# Patient Record
Sex: FEMALE | Race: WHITE
[De-identification: names, ages, dates, MRNs, and addresses within clinical notes are randomized per-mention and may not be internally consistent; named-entity substitution may affect disease eponyms.]

---
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
## 2022-04-07 ENCOUNTER — APPOINTMENT (OUTPATIENT)
Dept: NEUROLOGY | Facility: CLINIC | Age: 22
End: 2022-04-07
Payer: MEDICAID

## 2022-04-07 VITALS
TEMPERATURE: 97.8 F | BODY MASS INDEX: 23.32 KG/M2 | HEART RATE: 92 BPM | SYSTOLIC BLOOD PRESSURE: 115 MMHG | OXYGEN SATURATION: 99 % | HEIGHT: 65 IN | DIASTOLIC BLOOD PRESSURE: 76 MMHG | WEIGHT: 140 LBS

## 2022-04-07 DIAGNOSIS — G43.009 MIGRAINE W/OUT AURA, NOT INTRACTABLE, W/OUT STATUS MIGRAINOSUS: ICD-10-CM

## 2022-04-07 DIAGNOSIS — Z72.89 OTHER PROBLEMS RELATED TO LIFESTYLE: ICD-10-CM

## 2022-04-07 DIAGNOSIS — Z82.0 FAMILY HISTORY OF EPILEPSY AND OTHER DISEASES OF THE NERVOUS SYSTEM: ICD-10-CM

## 2022-04-07 DIAGNOSIS — F17.200 NICOTINE DEPENDENCE, UNSPECIFIED, UNCOMPLICATED: ICD-10-CM

## 2022-04-07 PROBLEM — Z00.00 ENCOUNTER FOR PREVENTIVE HEALTH EXAMINATION: Status: ACTIVE | Noted: 2022-04-07

## 2022-04-07 PROCEDURE — 99204 OFFICE O/P NEW MOD 45 MIN: CPT

## 2022-04-07 RX ORDER — NORGESTIMATE AND ETHINYL ESTRADIOL 7DAYSX3 LO
KIT ORAL
Refills: 0 | Status: ACTIVE | COMMUNITY

## 2022-04-07 NOTE — HISTORY OF PRESENT ILLNESS
[FreeTextEntry1] : Pt is 20 yo LH female states for past 5 months very bad migraines.  Throws up a lot and 3 times had blackouts in both her eyes and for year now hands shakes.  First migraine about age 20.   In past just regular headaches. \par \par When has blackouts she can't see for about a minutes.  She lays down.  Can't sleep due to the pain.  \par Tyenol doesn't help.\par \par No triggers for migraines. Mother gets migraines.\par No double vision.\par Sees black spots when stands.  Has been occurring for years.\par No tinnitus currently.  Had it in past, eventually went away.  5 years ago.\par Gets transient dizziness on elevators.  No hx of motion sickness or car sickness.\par \par Feels "really tired" about same time as migraines started.  Sleeps a lot more.  14 hours.  Sleeps patterns not regular.  Takes more naps than uses.   She gets dizzy when her body heat goes up (past year in gym gets dizzy and headache - not as severe as current ones). Feels like brain is too big for her head.\par \par

## 2022-04-07 NOTE — REVIEW OF SYSTEMS
[Feeling Tired] : feeling tired [Dizziness] : dizziness [Lightheadedness] : lightheadedness [Migraine Headache] : migraine headaches [Eyesight Problems] : eyesight problems [Vomiting] : vomiting [Fever] : no fever [Chills] : no chills [Feeling Poorly] : not feeling poorly [Recent Weight Gain (___ Lbs)] : no recent weight gain [Confused or Disoriented] : no confusion [Memory Lapses or Loss] : no memory loss [Decr. Concentrating Ability] : no decrease in concentrating ability [Facial Weakness] : no facial weakness [Arm Weakness] : no arm weakness [Hand Weakness] : no hand weakness [Leg Weakness] : no leg weakness [Poor Coordination] : good coordination [Numbness] : no numbness [Tingling] : no tingling [Seizures] : no convulsions [Fainting] : no fainting [Vertigo] : no vertigo [Cluster Headache] : no cluster headache [Tension Headache] : no tension-type headache [Difficulty Walking] : no difficulty walking [Inability to Walk] : able to walk [Suicidal] : not suicidal [Sleep Disturbances] : no sleep disturbances [Anxiety] : no anxiety [Depression] : no depression [Eye Pain] : no eye pain [Earache] : no earache [Loss Of Hearing] : no hearing loss [Heart Rate Is Slow] : the heart rate was not slow [Heart Rate Is Fast] : the heart rate was not fast [Chest Pain] : no chest pain [Palpitations] : no palpitations [Shortness Of Breath] : no shortness of breath [Wheezing] : no wheezing [Cough] : no cough [Abdominal Pain] : no abdominal pain [Constipation] : no constipation [Diarrhea] : no diarrhea [Dysuria] : no dysuria [Incontinence] : no incontinence [Joint Pain] : no joint pain [Joint Swelling] : no joint swelling [Joint Stiffness] : no joint stiffness [Skin Lesions] : no skin lesions [Skin Wound] : no skin wound [Hot Flashes] : no hot flashes [Muscle Weakness] : no muscle weakness [Easy Bleeding] : no tendency for easy bleeding [Easy Bruising] : no tendency for easy bruising [FreeTextEntry3] : left eye used to be 20/20, then 20/25, then 20/30

## 2022-04-07 NOTE — ASSESSMENT
[FreeTextEntry1] : Impression:\par Migraine headaches w/o aura.  She has family hx of migraines, drinks a lot of caffeine and has irregular sleep habits.  There were a few punctate WM changes seen in subcortical regions that seem compatible with her migraine history. \par \par Plan:\par 1.  Reassured patient regarding her normal exam and normal appearing brain on MRI.  I think the WM changes seen are minimal, not likely to be due to demyelinating disease (MS) and more likely related to migraine.\par 2.  Patient may get repeat MRI brain w/o contrast in 6-12 months to assess for any change.\par 3.  Encouraged patient to reduce caffeine, improve sleep patterns and get vitamin D level checked and if low supplement.\par 4.  May want to have PCP check TFT's given some tremulousness she feels in hands.\par 5.  She can consider magnesium oxide 400 mg/day and/or riboflavin (Vitamin B2) 400 mg/day for migraine prevention.  She may use advil or excedrin prn for acute migraine relief if needed.  If migraines become more severe or fail to respond to above may call for prescripton of sumatriptan or zolmitriptan.\par 6.  Return in 6 months.

## 2022-04-17 ENCOUNTER — TRANSCRIPTION ENCOUNTER (OUTPATIENT)
Age: 22
End: 2022-04-17

## 2022-07-14 ENCOUNTER — EMERGENCY (EMERGENCY)
Facility: HOSPITAL | Age: 22
LOS: 0 days | Discharge: HOME | End: 2022-07-14
Attending: PEDIATRICS | Admitting: PEDIATRICS

## 2022-07-14 VITALS
RESPIRATION RATE: 18 BRPM | WEIGHT: 145.28 LBS | OXYGEN SATURATION: 100 % | SYSTOLIC BLOOD PRESSURE: 124 MMHG | HEART RATE: 86 BPM | TEMPERATURE: 99 F | DIASTOLIC BLOOD PRESSURE: 74 MMHG

## 2022-07-14 DIAGNOSIS — J02.9 ACUTE PHARYNGITIS, UNSPECIFIED: ICD-10-CM

## 2022-07-14 DIAGNOSIS — R05.8 OTHER SPECIFIED COUGH: ICD-10-CM

## 2022-07-14 DIAGNOSIS — F17.200 NICOTINE DEPENDENCE, UNSPECIFIED, UNCOMPLICATED: ICD-10-CM

## 2022-07-14 DIAGNOSIS — R59.9 ENLARGED LYMPH NODES, UNSPECIFIED: ICD-10-CM

## 2022-07-14 PROCEDURE — 99283 EMERGENCY DEPT VISIT LOW MDM: CPT

## 2022-07-14 RX ORDER — DEXAMETHASONE 0.5 MG/5ML
10 ELIXIR ORAL ONCE
Refills: 0 | Status: COMPLETED | OUTPATIENT
Start: 2022-07-14 | End: 2022-07-14

## 2022-07-14 RX ADMIN — Medication 10 MILLIGRAM(S): at 10:05

## 2022-07-14 NOTE — ED PROVIDER NOTE - PATIENT PORTAL LINK FT
You can access the FollowMyHealth Patient Portal offered by [facility] by registering at the following website: [email or web address]. By joining [employer]’s FollowMyHealth portal, you will also be able to view your health information using other applications (apps) compatible with our system.

## 2022-07-14 NOTE — ED PROVIDER NOTE - NS ED ROS FT
Please advise if ok to change and what test would you like ordered. GEN:  no fever, no chills, no generalized weakness  NEURO:  no headache, no dizziness  ENT: +sore throat, no runny nose  CV:  no chest pain, no palpitations  RESP:  no sob, +cough  GI:  no nausea, no vomiting  SKIN:  no rash, no bruising

## 2022-07-14 NOTE — ED PROVIDER NOTE - NSFOLLOWUPINSTRUCTIONS_ED_ALL_ED_FT
Sore Throat      A sore throat is pain, burning, irritation, or scratchiness in the throat. When you have a sore throat, you may feel pain or tenderness in your throat when you swallow or talk.    Many things can cause a sore throat, including:  •An infection.      •Seasonal allergies.      •Dryness in the air.      •Irritants, such as smoke or pollution.      •Radiation treatment for cancer.      •Gastroesophageal reflux disease (GERD).      •A tumor.      A sore throat is often the first sign of another sickness. It may happen with other symptoms, such as coughing, sneezing, fever, and swollen neck glands. Most sore throats go away without medical treatment.      Follow these instructions at home:      Juice, water, and tea.        A do not smoke cigarettes sign.      Medicines     •Take over-the-counter and prescription medicines only as told by your health care provider.      •Children often get sore throats. Do not give your child aspirin because of the association with Reye's syndrome.      •Use throat sprays to soothe your throat as told by your health care provider.      Managing pain     To help with pain, try:  •Sipping warm liquids, such as broth, herbal tea, or warm water.      •Eating or drinking cold or frozen liquids, such as frozen ice pops.      •Gargling with a mixture of salt and water 3–4 times a day or as needed. To make salt water, completely dissolve ½–1 tsp (3–6 g) of salt in 1 cup (237 mL) of warm water.      •Sucking on hard candy or throat lozenges.      •Putting a cool-mist humidifier in your bedroom at night to moisten the air.      •Sitting in the bathroom with the door closed for 5–10 minutes while you run hot water in the shower.      General instructions    •Do not use any products that contain nicotine or tobacco. These products include cigarettes, chewing tobacco, and vaping devices, such as e-cigarettes. If you need help quitting, ask your health care provider.      •Rest as needed.      •Drink enough fluid to keep your urine pale yellow.      •Wash your hands often with soap and water for at least 20 seconds. If soap and water are not available, use hand .        Contact a health care provider if:    •You have a fever for more than 2–3 days.      •You have symptoms that last for more than 2–3 days.      •Your throat does not get better within 7 days.      •You have a fever and your symptoms suddenly get worse.        Get help right away if:    •You have difficulty breathing.      •You cannot swallow fluids, soft foods, or your saliva.      •You have increased swelling in your throat or neck.      •You have persistent nausea and vomiting.      These symptoms may represent a serious problem that is an emergency. Do not wait to see if the symptoms will go away. Get medical help right away. Call your local emergency services (911 in the [place] Do not drive yourself to the hospital.       Summary    •A sore throat is pain, burning, irritation, or scratchiness in the throat. Many things can cause a sore throat.      •Take over-the-counter medicines only as told by your health care provider.      •Rest as needed.      •Drink enough fluid to keep your urine pale yellow.      •Contact a health care provider if your throat does not get better within 7 days.      This information is not intended to replace advice given to you by your health care provider. Make sure you discuss any questions you have with your health care provider.

## 2022-07-14 NOTE — ED PROVIDER NOTE - PHYSICAL EXAMINATION
CONSTITUTIONAL: Well-developed; well-nourished; in no acute distress.   SKIN: warm, dry  HEAD: Normocephalic; atraumatic.  EYES: no conjunctival injection.  EOMI.   ENT: No nasal discharge; airway clear. mildly erythema of pharynx  NECK: Supple; non tender. +left submandibular non-tender well-circumscribed lymph node  EXT: Normal ROM.  No clubbing, cyanosis or edema.   NEURO: Alert, oriented, grossly unremarkable.  PSYCH: Cooperative, appropriate.

## 2022-07-14 NOTE — ED PROVIDER NOTE - OBJECTIVE STATEMENT
23 yo female, no PMHx, presents with sore throat x1 week, no alleviating or aggravating factors, associated with intermittent dry cough. Of note, patient states she had a swollen lymph node a few weeks ago for which she had an US that showed that it appeared benign non-reactive and she is pending ENT follow up but has not been able to yet. Denies fevers, chills, nausea, vomiting, headache, dizziness, shortness of breath.

## 2022-07-14 NOTE — ED PROVIDER NOTE - CARE PROVIDERS DIRECT ADDRESSES
,[email or web address]

## 2022-07-14 NOTE — ED PROVIDER NOTE - CARE PROVIDER_API CALL
Preeti Keller (MD)  Pediatrics  [address] [place] [address]  Phone: [phone]  Fax: [phone]  Follow Up Time: 1-3 Days    Denise Oliveira)  Surgery  ENT  [address], [address] [place] [address]  Phone: [phone]  Fax: [phone]  Follow Up Time: 1-3 Days

## 2022-07-14 NOTE — ED PROVIDER NOTE - PROVIDER TOKENS
PROVIDER:[TOKEN:[[id number]],FOLLOWUP:[1-3 Days]],PROVIDER:[TOKEN:[[id number]],FOLLOWUP:[1-3 Days]]

## 2022-07-14 NOTE — ED PROVIDER NOTE - ATTENDING CONTRIBUTION TO CARE
21 yo f presents for evaluation of a sore throat. Denies any difficulty swallowing or drooling. Pt noticed a lymph node and showed the doctor she works for in [place] who sent her for a US. Pt saw an ENT who thought it was reactive LN prescribed abx which she took pt reports its still there and now her throat hurts so came for evaluation. Has not seen ent for follow up yet. Pt think the LN got smaller since starting abx. No uri symptoms. No recent illnesses. No other concerns. Pt states she cant take off of work so "I had to come to the ed so my boss think its serious". VS reviewed pt well appearing nad heent eomi perrl no conjunctival injection TM wnl no sign of mastoditis pharynx no erythema or exudates no significant cervical LAD pt LN in question is barely palpable nontedner mobile cvs rrr s1 s2 no murmurs lungs ctabl abd soft nt nd no guarding no HSM ext from x 4 skin no rash wwp cap refil <2 neuro exam grossly normal A: Lymphadenopathy P: Outpt ent follow up as previously discussed with ENT who she already saw.

## 2022-07-25 ENCOUNTER — NON-APPOINTMENT (OUTPATIENT)
Age: 22
End: 2022-07-25

## 2022-07-27 ENCOUNTER — NON-APPOINTMENT (OUTPATIENT)
Age: 22
End: 2022-07-27

## 2022-08-31 ENCOUNTER — APPOINTMENT (OUTPATIENT)
Dept: OTOLARYNGOLOGY | Facility: CLINIC | Age: 22
End: 2022-08-31

## 2022-09-10 ENCOUNTER — NON-APPOINTMENT (OUTPATIENT)
Age: 22
End: 2022-09-10

## 2022-10-10 ENCOUNTER — NON-APPOINTMENT (OUTPATIENT)
Age: 22
End: 2022-10-10

## 2022-11-05 ENCOUNTER — EMERGENCY (EMERGENCY)
Facility: HOSPITAL | Age: 22
LOS: 0 days | Discharge: HOME | End: 2022-11-05
Attending: EMERGENCY MEDICINE | Admitting: EMERGENCY MEDICINE

## 2022-11-05 VITALS
SYSTOLIC BLOOD PRESSURE: 142 MMHG | HEART RATE: 113 BPM | WEIGHT: 145.06 LBS | RESPIRATION RATE: 18 BRPM | DIASTOLIC BLOOD PRESSURE: 75 MMHG | OXYGEN SATURATION: 100 % | TEMPERATURE: 98 F

## 2022-11-05 VITALS — HEART RATE: 89 BPM

## 2022-11-05 DIAGNOSIS — R61 GENERALIZED HYPERHIDROSIS: ICD-10-CM

## 2022-11-05 DIAGNOSIS — R06.02 SHORTNESS OF BREATH: ICD-10-CM

## 2022-11-05 DIAGNOSIS — R07.9 CHEST PAIN, UNSPECIFIED: ICD-10-CM

## 2022-11-05 DIAGNOSIS — M54.9 DORSALGIA, UNSPECIFIED: ICD-10-CM

## 2022-11-05 DIAGNOSIS — Z79.3 LONG TERM (CURRENT) USE OF HORMONAL CONTRACEPTIVES: ICD-10-CM

## 2022-11-05 LAB
ALBUMIN SERPL ELPH-MCNC: 4.5 G/DL — SIGNIFICANT CHANGE UP (ref 3.5–5.2)
ALP SERPL-CCNC: 94 U/L — SIGNIFICANT CHANGE UP (ref 30–115)
ALT FLD-CCNC: 10 U/L — LOW (ref 14–37)
ANION GAP SERPL CALC-SCNC: 11 MMOL/L — SIGNIFICANT CHANGE UP (ref 7–14)
AST SERPL-CCNC: 13 U/L — LOW (ref 14–37)
BASOPHILS # BLD AUTO: 0.05 K/UL — SIGNIFICANT CHANGE UP (ref 0–0.2)
BASOPHILS NFR BLD AUTO: 0.5 % — SIGNIFICANT CHANGE UP (ref 0–1)
BILIRUB SERPL-MCNC: <0.2 MG/DL — SIGNIFICANT CHANGE UP (ref 0.2–1.2)
BUN SERPL-MCNC: 11 MG/DL — SIGNIFICANT CHANGE UP (ref 10–20)
CALCIUM SERPL-MCNC: 9.5 MG/DL — SIGNIFICANT CHANGE UP (ref 8.4–10.5)
CHLORIDE SERPL-SCNC: 102 MMOL/L — SIGNIFICANT CHANGE UP (ref 98–110)
CO2 SERPL-SCNC: 25 MMOL/L — SIGNIFICANT CHANGE UP (ref 17–32)
CREAT SERPL-MCNC: 0.6 MG/DL — LOW (ref 0.7–1.5)
D DIMER BLD IA.RAPID-MCNC: <150 NG/ML DDU — SIGNIFICANT CHANGE UP (ref 0–230)
EGFR: 132 ML/MIN/1.73M2 — SIGNIFICANT CHANGE UP
EOSINOPHIL # BLD AUTO: 0.18 K/UL — SIGNIFICANT CHANGE UP (ref 0–0.7)
EOSINOPHIL NFR BLD AUTO: 1.9 % — SIGNIFICANT CHANGE UP (ref 0–8)
GLUCOSE SERPL-MCNC: 94 MG/DL — SIGNIFICANT CHANGE UP (ref 70–99)
HCG SERPL QL: NEGATIVE — SIGNIFICANT CHANGE UP
HCT VFR BLD CALC: 39.4 % — SIGNIFICANT CHANGE UP (ref 37–47)
HGB BLD-MCNC: 13.3 G/DL — SIGNIFICANT CHANGE UP (ref 12–16)
IMM GRANULOCYTES NFR BLD AUTO: 0.3 % — SIGNIFICANT CHANGE UP (ref 0.1–0.3)
LYMPHOCYTES # BLD AUTO: 3.08 K/UL — SIGNIFICANT CHANGE UP (ref 1.2–3.4)
LYMPHOCYTES # BLD AUTO: 32.6 % — SIGNIFICANT CHANGE UP (ref 20.5–51.1)
MAGNESIUM SERPL-MCNC: 2 MG/DL — SIGNIFICANT CHANGE UP (ref 1.8–2.4)
MCHC RBC-ENTMCNC: 30.4 PG — SIGNIFICANT CHANGE UP (ref 27–31)
MCHC RBC-ENTMCNC: 33.8 G/DL — SIGNIFICANT CHANGE UP (ref 32–37)
MCV RBC AUTO: 90.2 FL — SIGNIFICANT CHANGE UP (ref 81–99)
MONOCYTES # BLD AUTO: 0.54 K/UL — SIGNIFICANT CHANGE UP (ref 0.1–0.6)
MONOCYTES NFR BLD AUTO: 5.7 % — SIGNIFICANT CHANGE UP (ref 1.7–9.3)
NEUTROPHILS # BLD AUTO: 5.58 K/UL — SIGNIFICANT CHANGE UP (ref 1.4–6.5)
NEUTROPHILS NFR BLD AUTO: 59 % — SIGNIFICANT CHANGE UP (ref 42.2–75.2)
NRBC # BLD: 0 /100 WBCS — SIGNIFICANT CHANGE UP (ref 0–0)
NT-PROBNP SERPL-SCNC: 55 PG/ML — SIGNIFICANT CHANGE UP (ref 0–300)
PLATELET # BLD AUTO: 287 K/UL — SIGNIFICANT CHANGE UP (ref 130–400)
POTASSIUM SERPL-MCNC: 4.1 MMOL/L — SIGNIFICANT CHANGE UP (ref 3.5–5)
POTASSIUM SERPL-SCNC: 4.1 MMOL/L — SIGNIFICANT CHANGE UP (ref 3.5–5)
PROT SERPL-MCNC: 7 G/DL — SIGNIFICANT CHANGE UP (ref 6–8)
RBC # BLD: 4.37 M/UL — SIGNIFICANT CHANGE UP (ref 4.2–5.4)
RBC # FLD: 11.4 % — LOW (ref 11.5–14.5)
SODIUM SERPL-SCNC: 138 MMOL/L — SIGNIFICANT CHANGE UP (ref 135–146)
TROPONIN T SERPL-MCNC: <0.01 NG/ML — SIGNIFICANT CHANGE UP
WBC # BLD: 9.46 K/UL — SIGNIFICANT CHANGE UP (ref 4.8–10.8)
WBC # FLD AUTO: 9.46 K/UL — SIGNIFICANT CHANGE UP (ref 4.8–10.8)

## 2022-11-05 PROCEDURE — 99285 EMERGENCY DEPT VISIT HI MDM: CPT

## 2022-11-05 PROCEDURE — 93010 ELECTROCARDIOGRAM REPORT: CPT

## 2022-11-05 PROCEDURE — 71045 X-RAY EXAM CHEST 1 VIEW: CPT | Mod: 26

## 2022-11-05 RX ORDER — IBUPROFEN 200 MG
600 TABLET ORAL ONCE
Refills: 0 | Status: COMPLETED | OUTPATIENT
Start: 2022-11-05 | End: 2022-11-05

## 2022-11-05 RX ADMIN — Medication 600 MILLIGRAM(S): at 19:48

## 2022-11-05 NOTE — ED PROVIDER NOTE - CLINICAL SUMMARY MEDICAL DECISION MAKING FREE TEXT BOX
22-year-old female with no past medical history, vaccines up-to-date, presenting with 3 days of chest pain, dull located in the middle of her chest, nonradiating, worse with deep inspiration and some mild shortness of breath.  Patient also noted night sweats 2 nights ago.  No fever.  Patient is taking oral contraceptives and vapes nicotine daily, 1 cartridge per day, but no cigarette smoking.  No history of clotting disorders in patient or family.  Patient also has had back pain for few weeks.  No saddle anesthesia, weakness, bowel or bladder incontinence.  Exam - Gen - NAD, Head - NCAT, Pharynx - clear, MMM, Heart - RRR, no m/g/r, chest–mild tenderness to palpation of the sternum, lungs - CTAB, no w/c/r, Abdomen - soft, NT, ND, Skin - No rash, Extremities - FROM, no edema, erythema, ecchymosis, Neuro - CN 2-12 intact, nl strength and sensation, nl gait.  Plan–labs, chest x-ray, EKG, Motrin.  Labs within normal limits.  Chest x-ray and EKG within normal limits.  Patient improved and discharged home.  Diagnosis–chest pain, likely costochondritis.  Advised follow-up with cardiology outpatient and PMD.  Given return precautions.

## 2022-11-05 NOTE — ED ADULT NURSE NOTE - NSIMPLEMENTINTERV_GEN_ALL_ED
Implemented All Universal Safety Interventions:  [place] to call system. Call bell, personal items and telephone within reach. Instruct patient to call for assistance. Room bathroom lighting operational. Non-slip footwear when patient is off stretcher. Physically safe environment: no spills, clutter or unnecessary equipment. Stretcher in lowest position, wheels locked, appropriate side rails in place.

## 2022-11-05 NOTE — ED PROVIDER NOTE - OBJECTIVE STATEMENT
22 y f, no pmh, up to date w vaccination, pw cp, started three days ago, dull, middle of her chest, nonradiating, +diaphoresis mostly at night, no n/v, mild sob, taking oral contraceptives, vapes daily multiple cartriges but no cigarette smoking, no hx of dvt/pe. No f/c. Also endorses back pain for past couple of weeks. Denies saddle anesthesia, leg weakness, iv drug abuse, hx of cancer, incontinence.

## 2022-11-05 NOTE — ED PROVIDER NOTE - CARE PROVIDER_API CALL
Mily Espinoza)  Neurosurgery  [address], [address]  [place] [address]  Phone: [phone]  Fax: [phone]  Follow Up Time: 1-3 Days

## 2022-11-05 NOTE — ED PROVIDER NOTE - PATIENT PORTAL LINK FT
You can access the FollowMyHealth Patient Portal offered by [facility] by registering at the following website: [email or web address]. By joining [facility] [employer]’s FollowMyHealth portal, you will also be able to view your health information using other applications (apps) compatible with our system.

## 2022-11-05 NOTE — ED PROVIDER NOTE - NSFOLLOWUPINSTRUCTIONS_ED_ALL_ED_FT
Chest Pain    Chest pain can be caused by many different conditions which may or may not be dangerous. Causes include heartburn, lung infections, heart attack, blood clot in lungs, skin infections, strain or damage to muscle, cartilage, or bones, etc. Lab tests or other studies including an electrocardiogram (EKG) may have been performed to find the cause of your pain. Make sure to follow up with a cardiologist or as instructed by your health care professional.    SEEK IMMEDIATE MEDICAL CARE IF YOU HAVE THE FOLLOWING SYMPTOMS: worsening chest pain, coughing up blood, unexplained back/neck/jaw pain, severe abdominal pain, dizziness or lightheadedness, shortness of breath, sweaty or clammy skin, vomiting, or racing heart beat. These symptoms may represent a serious problem that is an emergency. Do not wait to see if the symptoms will go away. Get medical help right away. Call your local emergency services (911 in the [place] Do not drive yourself to the hospital.    Back Pain    Back pain is very common in adults. The cause of back pain is rarely dangerous and the pain often gets better over time. The cause of your back pain may not be known and may include strain of muscles or ligaments, degeneration of the spinal disks, or arthritis. Occasionally the pain may radiate down your leg(s). Over-the-counter medicines to reduce pain and inflammation are often the most helpful. Stretching and remaining active frequently helps the healing process.     SEEK IMMEDIATE MEDICAL CARE IF YOU HAVE THE FOLLOWING SYMPTOMS: bowel or bladder control problems, unusual weakness or numbness in your arms or legs, nausea or vomiting, abdominal pain, fever, dizziness/lightheadedness.

## 2022-11-07 ENCOUNTER — EMERGENCY (EMERGENCY)
Facility: HOSPITAL | Age: 22
LOS: 0 days | Discharge: HOME | End: 2022-11-07
Attending: EMERGENCY MEDICINE | Admitting: EMERGENCY MEDICINE

## 2022-11-07 VITALS
WEIGHT: 145.06 LBS | OXYGEN SATURATION: 99 % | HEART RATE: 113 BPM | SYSTOLIC BLOOD PRESSURE: 128 MMHG | DIASTOLIC BLOOD PRESSURE: 83 MMHG | RESPIRATION RATE: 20 BRPM | TEMPERATURE: 98 F

## 2022-11-07 DIAGNOSIS — J40 BRONCHITIS, NOT SPECIFIED AS ACUTE OR CHRONIC: ICD-10-CM

## 2022-11-07 DIAGNOSIS — R07.89 OTHER CHEST PAIN: ICD-10-CM

## 2022-11-07 DIAGNOSIS — R00.0 TACHYCARDIA, UNSPECIFIED: ICD-10-CM

## 2022-11-07 DIAGNOSIS — R06.02 SHORTNESS OF BREATH: ICD-10-CM

## 2022-11-07 DIAGNOSIS — Z98.890 OTHER SPECIFIED POSTPROCEDURAL STATES: Chronic | ICD-10-CM

## 2022-11-07 DIAGNOSIS — F17.290 NICOTINE DEPENDENCE, OTHER TOBACCO PRODUCT, UNCOMPLICATED: ICD-10-CM

## 2022-11-07 DIAGNOSIS — Z90.89 ACQUIRED ABSENCE OF OTHER ORGANS: Chronic | ICD-10-CM

## 2022-11-07 DIAGNOSIS — R05.1 ACUTE COUGH: ICD-10-CM

## 2022-11-07 DIAGNOSIS — Z90.89 ACQUIRED ABSENCE OF OTHER ORGANS: ICD-10-CM

## 2022-11-07 PROCEDURE — 93010 ELECTROCARDIOGRAM REPORT: CPT

## 2022-11-07 PROCEDURE — 99284 EMERGENCY DEPT VISIT MOD MDM: CPT

## 2022-11-07 RX ORDER — IBUPROFEN 200 MG
600 TABLET ORAL ONCE
Refills: 0 | Status: COMPLETED | OUTPATIENT
Start: 2022-11-07 | End: 2022-11-07

## 2022-11-07 RX ADMIN — Medication 600 MILLIGRAM(S): at 08:30

## 2022-11-07 NOTE — ED ADULT TRIAGE NOTE - CHIEF COMPLAINT QUOTE
Patient stated she was seen and treated for sore throat cough SOB and CP recently but states her symptoms have only worsened

## 2022-11-07 NOTE — ED PROVIDER NOTE - NS ED ROS FT
CONSTITUTIONAL: No fevers, (+) chills, (+) night sweats  Head: no headache  EYES/ENT: No eye discharge, (+) throat pain, (+) nasal congestion, no rhinorrhea, no otalgia.  NECK: No pain  RESPIRATORY: (+) cough, no wheezing, no increase work of breathing, (+) shortness of breath.  CARDIOVASCULAR: (+) chest pain, no palpitations.  GASTROINTESTINAL: No abdominal pain. No nausea, no vomiting. No diarrhea, No decrease appetite. No hematemesis. No melena or hematochezia.  GENITOURINARY: No dysuria, frequency or hematuria.

## 2022-11-07 NOTE — ED PROVIDER NOTE - PHYSICAL EXAMINATION
PHYSICAL EXAM:  GENERAL: NAD, lying in bed comfortably  EYES: EOMI, PERRLA, conjunctiva and sclera clear  ENT: MMM, (+) mild pharyngeal erythema, no pallor/petechiae; TMs clear b/l  NECK: Supple, No LAD  LUNG: CTA b/l; no r/r/w/r. Unlabored respirations  HEART: (+) slight tachycardia, regular rhythm, +S1/S2; No m/r/g  ABDOMEN: soft, NT/ND; BS x 4   EXTREMITIES:  2+ Peripheral Pulses, brisk cap refill  MSK: no reproducible chest pain or back pain on palpation   SKIN: No rashes or lesions

## 2022-11-07 NOTE — ED PROVIDER NOTE - ATTENDING CONTRIBUTION TO CARE
22-year-old female, no past medical history, up-to-date on vaccines, comes in complaining of 1 week history of cough, sore throat, shortness of breath, and chest soreness.  Patient was seen in the ED 2 days ago for same, had labs, chest x-ray, EKG done at that time.  Patient states she continues to have symptoms.  It is day 7 of symptoms.  Patient has been taking DayQuil as needed with minimal improvement. On exam, pt in NAD, AAOx3, head NC/AT, CN II-XII intact, PEERL, EOMi, throat (-) erythema/exudates, neck (-) midline tenderness, lungs CTA B/L, CV S1S2 regular, abdomen soft/NT/ND/(+)BS, ext (-) edema, motor 5/5x4, sensation intact, ambulating with steady gait. Most likely viral illness. Will d/c.

## 2022-11-07 NOTE — ED PEDIATRIC NURSE NOTE - OBJECTIVE STATEMENT
Pt. reports chest pain, SOB, cough, stuffy nose and ear pain x 1 week. Pt. states she has a productive cough with green mucus. Upon assessment, pt. lungs are clear.

## 2022-11-07 NOTE — ED PROVIDER NOTE - PATIENT PORTAL LINK FT
You can access the FollowMyHealth Patient Portal offered by [facility] by registering at the following website: [email or web address]. By joining [employer]’s FollowMyHealth portal, you will also be able to view your health information using other applications (apps) compatible with our system.

## 2022-11-07 NOTE — ED PROVIDER NOTE - OBJECTIVE STATEMENT
21 yo F no PMH, UTD on vaccines, p/w 1 week history of cough, SOB, and chest pain. Seen in ED 2 days ago for same symptoms, unchanged. Describes cough as constant and productive resulting in sore throat and occasionally post-tussive emesis with green sputum. Has been taking Dayquil prn without improvement. No sick contacts. Endorses vaping nicotine multiple times per day. PSH of tonsillectomy and rhinoplasty. 21 yo F no PMH, UTD on vaccines, p/w 1 week history of cough, SOB, and chest pain. Seen in the ED 2 days ago for same symptoms, unchanged prompting return to ED today. Describes cough as constant and productive resulting in sore throat and occasionally post-tussive emesis with green sputum. Has been taking Dayquil prn without improvement. Tolerating PO at baseline. Endorses vaping nicotine multiple times per day. PSH of tonsillectomy and rhinoplasty. No sick contacts. UTD on flu and COVID vaccines.

## 2022-11-07 NOTE — ED PROVIDER NOTE - CARE PROVIDER_API CALL
Preeti Keller (MD)  Pediatrics  [address] [place] [address]  Phone: [phone]  Fax: [phone]  Follow Up Time: 1-3 Days

## 2022-11-07 NOTE — ED PROVIDER NOTE - NSFOLLOWUPINSTRUCTIONS_ED_ALL_ED_FT
Bronchitis   Acute bronchitis is sudden inflammation of the main airways (bronchi) that come off the windpipe (trachea) in the lungs. The swelling causes the airways to get smaller and make more mucus than normal. This can make it hard to breathe and can cause coughing or noisy breathing (wheezing).    Acute bronchitis may last several weeks. The cough may last longer. Allergies, asthma, and exposure to smoke may make the condition worse.    What are the causes?  This condition can be caused by germs and by substances that irritate the lungs, including:  Cold and flu viruses. The most common cause of this condition is the virus that causes the common cold.  Bacteria. This is less common.  Breathing in substances that irritate the lungs, including:  •Smoke from cigarettes and other forms of tobacco.  •Dust and pollen.  •Fumes from household cleaning products, gases, or burned fuel.  •Indoor or outdoor air pollution.    What increases the risk?  The following factors may make you more likely to develop this condition:  •A weak body's defense system, also called the immune system.  •A condition that affects your lungs and breathing, such as asthma.    What are the signs or symptoms?    Common symptoms of this condition include:  •Coughing. This may bring up clear, yellow, or green mucus from your lungs (sputum).  •Wheezing.  •Runny or stuffy nose.  •Having too much mucus in your lungs (chest congestion).  •Shortness of breath.  •Aches and pains, including sore throat or chest.    How is this diagnosed?  This condition is usually diagnosed based on:  •Your symptoms and medical history.  •A physical exam.    You may also have other tests, including tests to rule out other conditions, such as pneumonia. These tests include:  •A test of lung function.  •Test of a mucus sample to look for the presence of bacteria.  •Tests to check the oxygen level in your blood.  •Blood tests.  •Chest X-ray.    How is this treated?  Most cases of acute bronchitis clear up over time without treatment. Your health care provider may recommend:  •Drinking more fluids to help thin your mucus so it is easier to cough up.  •Taking inhaled medicine (inhaler) to improve air flow in and out of your lungs.  •Using a vaporizer or a humidifier. These are machines that add water to the air to help you breathe better.  •Taking a medicine that thins mucus and clears congestion (expectorant).  •Taking a medicine that prevents or stops coughing (cough suppressant).    It is not common to take an antibiotic medicine for this condition.  Follow these instructions at home:  A do not smoke cigarettes sign.   •Take over-the-counter and prescription medicines only as told by your health care provider.  •Use an inhaler, vaporizer, or humidifier as told by your health care provider.  •Take two teaspoons (10 mL) of honey at bedtime to lessen coughing at night.  •Drink enough fluid to keep your urine pale yellow.  • Do not use any products that contain nicotine or tobacco. These products include cigarettes, chewing tobacco, and vaping devices, such as e-cigarettes. If you need help quitting, ask your health care provider.  •Get plenty of rest.  •Return to your normal activities as told by your health care provider. Ask your health care provider what activities are safe for you.  •Keep all follow-up visits. This is important.    How is this prevented?  Washing hands with soap and water.   To lower your risk of getting this condition again:  •Wash your hands often with soap and water for at least 20 seconds. If soap and water are not available, use hand .  •Avoid contact with people who have cold symptoms.  •Try not to touch your mouth, nose, or eyes with your hands.  •Avoid breathing in smoke or chemical fumes. Breathing smoke or chemical fumes will make your condition worse.  •Get the flu shot every year.    Contact a health care provider if:  •Your symptoms do not improve after 2 weeks.  •You have trouble coughing up the mucus.  •Your cough keeps you awake at night.  •You have a fever.    Get help right away if you:  •Cough up blood.  •Feel pain in your chest.  •Have severe shortness of breath.  •Faint or keep feeling like you are going to faint.  •Have a severe headache.  •Have a fever or chills that get worse.    Pharyngitis   Pharyngitis is a sore throat (pharynx). This is when there is redness, pain, and swelling in your throat. Most of the time, this condition gets better on its own. In some cases, you may need medicine.    What are the causes?  •An infection from a virus.  •An infection from bacteria.  •Allergies.    What increases the risk?  •Being 5–24 years old.  •Being in crowded environments. These include:  •Daycares  •Schools  •Dormitories  •Living in a place with cold temperatures outside  •Having a weakened disease-fighting (immune) system    What are the signs or symptoms?  Symptoms may vary depending on the cause. Common symptoms include:  •Sore throat.  •Tiredness (fatigue).  •Low-grade fever.  •Stuffy nose.  •Cough.  •Headache.    Other symptoms may include:  •Glands in the neck (lymph nodes) that are swollen.  •Skin rashes.  •Film on the throat or tonsils. This can be caused by an infection from bacteria.  •Vomiting.  •Red, itchy eyes.  •Loss of appetite.  •Joint pain and muscle aches.  •Tonsils that are temporarily bigger than usual (enlarged).    How is this treated?  Many times, treatment is not needed. This condition usually gets better in 3–4 days without treatment.  If the infection is caused by a bacteria, you may be need to take antibiotics.    General instructions   A do not smoke cigarettes sign.   • Do not smoke or use any products that contain nicotine or tobacco. If you need help quitting, ask your doctor.  •Rest as told by your doctor.  •Drink enough fluid to keep your pee (urine) pale yellow.

## 2022-12-07 ENCOUNTER — APPOINTMENT (OUTPATIENT)
Dept: NEUROSURGERY | Facility: CLINIC | Age: 22
End: 2022-12-07

## 2023-02-25 ENCOUNTER — NON-APPOINTMENT (OUTPATIENT)
Age: 23
End: 2023-02-25

## 2023-03-25 ENCOUNTER — NON-APPOINTMENT (OUTPATIENT)
Age: 23
End: 2023-03-25

## 2023-05-20 ENCOUNTER — NON-APPOINTMENT (OUTPATIENT)
Age: 23
End: 2023-05-20

## 2023-07-02 ENCOUNTER — NON-APPOINTMENT (OUTPATIENT)
Age: 23
End: 2023-07-02

## 2023-11-03 ENCOUNTER — NON-APPOINTMENT (OUTPATIENT)
Age: 23
End: 2023-11-03

## 2023-11-16 ENCOUNTER — OUTPATIENT (OUTPATIENT)
Dept: OUTPATIENT SERVICES | Facility: HOSPITAL | Age: 23
LOS: 1 days | End: 2023-11-16

## 2023-11-16 DIAGNOSIS — K02.9 DENTAL CARIES, UNSPECIFIED: ICD-10-CM

## 2023-11-16 DIAGNOSIS — Z90.89 ACQUIRED ABSENCE OF OTHER ORGANS: Chronic | ICD-10-CM

## 2023-11-16 DIAGNOSIS — K01.0 EMBEDDED TEETH: ICD-10-CM

## 2023-11-16 DIAGNOSIS — Z98.890 OTHER SPECIFIED POSTPROCEDURAL STATES: Chronic | ICD-10-CM

## 2023-11-16 PROBLEM — Z78.9 OTHER SPECIFIED HEALTH STATUS: Chronic | Status: ACTIVE | Noted: 2022-11-07

## 2023-11-16 PROCEDURE — D0140: CPT

## 2023-11-16 PROCEDURE — D0330: CPT

## 2024-01-21 ENCOUNTER — NON-APPOINTMENT (OUTPATIENT)
Age: 24
End: 2024-01-21

## 2024-12-10 ENCOUNTER — NON-APPOINTMENT (OUTPATIENT)
Age: 24
End: 2024-12-10

## 2024-12-25 PROBLEM — F10.90 ALCOHOL USE: Status: ACTIVE | Noted: 2022-04-07

## 2025-06-12 ENCOUNTER — NON-APPOINTMENT (OUTPATIENT)
Age: 25
End: 2025-06-12